# Patient Record
Sex: FEMALE | Race: WHITE | NOT HISPANIC OR LATINO | Employment: OTHER | URBAN - METROPOLITAN AREA
[De-identification: names, ages, dates, MRNs, and addresses within clinical notes are randomized per-mention and may not be internally consistent; named-entity substitution may affect disease eponyms.]

---

## 2021-05-30 ENCOUNTER — RECORDS - HEALTHEAST (OUTPATIENT)
Dept: ADMINISTRATIVE | Facility: CLINIC | Age: 61
End: 2021-05-30

## 2023-03-20 ENCOUNTER — APPOINTMENT (OUTPATIENT)
Dept: CT IMAGING | Facility: CLINIC | Age: 63
End: 2023-03-20
Attending: EMERGENCY MEDICINE
Payer: COMMERCIAL

## 2023-03-20 LAB
ANION GAP SERPL CALCULATED.3IONS-SCNC: 11 MMOL/L (ref 5–18)
ATRIAL RATE - MUSE: 93 BPM
BUN SERPL-MCNC: 19 MG/DL (ref 8–22)
CALCIUM SERPL-MCNC: 9.4 MG/DL (ref 8.5–10.5)
CHLORIDE BLD-SCNC: 108 MMOL/L (ref 98–107)
CO2 SERPL-SCNC: 21 MMOL/L (ref 22–31)
CREAT SERPL-MCNC: 0.77 MG/DL (ref 0.6–1.1)
DIASTOLIC BLOOD PRESSURE - MUSE: NORMAL MMHG
ERYTHROCYTE [DISTWIDTH] IN BLOOD BY AUTOMATED COUNT: 12.4 % (ref 10–15)
GFR SERPL CREATININE-BSD FRML MDRD: 88 ML/MIN/1.73M2
GLUCOSE BLD-MCNC: 98 MG/DL (ref 70–125)
HCT VFR BLD AUTO: 44.3 % (ref 35–47)
HGB BLD-MCNC: 15.2 G/DL (ref 11.7–15.7)
HOLD SPECIMEN: NORMAL
INTERPRETATION ECG - MUSE: NORMAL
MCH RBC QN AUTO: 31.7 PG (ref 26.5–33)
MCHC RBC AUTO-ENTMCNC: 34.3 G/DL (ref 31.5–36.5)
MCV RBC AUTO: 92 FL (ref 78–100)
P AXIS - MUSE: 61 DEGREES
PLATELET # BLD AUTO: 317 10E3/UL (ref 150–450)
POTASSIUM BLD-SCNC: 4.2 MMOL/L (ref 3.5–5)
PR INTERVAL - MUSE: 138 MS
QRS DURATION - MUSE: 76 MS
QT - MUSE: 364 MS
QTC - MUSE: 452 MS
R AXIS - MUSE: 51 DEGREES
RBC # BLD AUTO: 4.8 10E6/UL (ref 3.8–5.2)
SODIUM SERPL-SCNC: 140 MMOL/L (ref 136–145)
SYSTOLIC BLOOD PRESSURE - MUSE: NORMAL MMHG
T AXIS - MUSE: 62 DEGREES
TROPONIN I SERPL-MCNC: <0.01 NG/ML (ref 0–0.29)
VENTRICULAR RATE- MUSE: 93 BPM
WBC # BLD AUTO: 10.2 10E3/UL (ref 4–11)

## 2023-03-20 PROCEDURE — 84484 ASSAY OF TROPONIN QUANT: CPT | Performed by: EMERGENCY MEDICINE

## 2023-03-20 PROCEDURE — 36415 COLL VENOUS BLD VENIPUNCTURE: CPT | Performed by: EMERGENCY MEDICINE

## 2023-03-20 PROCEDURE — 85027 COMPLETE CBC AUTOMATED: CPT | Performed by: EMERGENCY MEDICINE

## 2023-03-20 PROCEDURE — 74174 CTA ABD&PLVS W/CONTRAST: CPT

## 2023-03-20 PROCEDURE — 99285 EMERGENCY DEPT VISIT HI MDM: CPT | Mod: 25

## 2023-03-20 PROCEDURE — 93005 ELECTROCARDIOGRAM TRACING: CPT | Performed by: EMERGENCY MEDICINE

## 2023-03-20 PROCEDURE — 80048 BASIC METABOLIC PNL TOTAL CA: CPT | Performed by: EMERGENCY MEDICINE

## 2023-03-20 PROCEDURE — 250N000011 HC RX IP 250 OP 636: Performed by: EMERGENCY MEDICINE

## 2023-03-20 RX ORDER — IOPAMIDOL 755 MG/ML
100 INJECTION, SOLUTION INTRAVASCULAR ONCE
Status: COMPLETED | OUTPATIENT
Start: 2023-03-20 | End: 2023-03-20

## 2023-03-20 RX ADMIN — IOPAMIDOL 100 ML: 755 INJECTION, SOLUTION INTRAVENOUS at 21:37

## 2023-03-20 ASSESSMENT — HEART SCORE
TROPONIN: LESS THAN OR EQUAL TO NORMAL LIMIT
AGE: 45-64
HISTORY: SLIGHTLY SUSPICIOUS
RISK FACTORS: 1-2 RISK FACTORS
ECG: NORMAL
HEART SCORE: 2

## 2023-03-20 NOTE — ED TRIAGE NOTES
Pt presents with left sided chest pain and left shoulder pain x 3 days. Shoulder pain has been on-going. Pt endorses intermittent shortness of breath. Pain reported as pressure. Pt is a smoker

## 2023-03-21 ENCOUNTER — PATIENT OUTREACH (OUTPATIENT)
Dept: ONCOLOGY | Facility: CLINIC | Age: 63
End: 2023-03-21
Payer: COMMERCIAL

## 2023-03-21 ENCOUNTER — HOSPITAL ENCOUNTER (EMERGENCY)
Facility: CLINIC | Age: 63
Discharge: HOME | End: 2023-03-21
Attending: EMERGENCY MEDICINE | Admitting: EMERGENCY MEDICINE
Payer: COMMERCIAL

## 2023-03-21 VITALS
RESPIRATION RATE: 16 BRPM | HEIGHT: 62 IN | DIASTOLIC BLOOD PRESSURE: 94 MMHG | OXYGEN SATURATION: 95 % | SYSTOLIC BLOOD PRESSURE: 171 MMHG | WEIGHT: 140 LBS | BODY MASS INDEX: 25.76 KG/M2 | HEART RATE: 81 BPM | TEMPERATURE: 97.9 F

## 2023-03-21 DIAGNOSIS — R07.9 CHEST PAIN, UNSPECIFIED TYPE: ICD-10-CM

## 2023-03-21 DIAGNOSIS — R19.00 PELVIC MASS: ICD-10-CM

## 2023-03-21 DIAGNOSIS — N94.89 ADNEXAL MASS: Primary | ICD-10-CM

## 2023-03-21 DIAGNOSIS — I10 HYPERTENSION, UNSPECIFIED TYPE: ICD-10-CM

## 2023-03-21 LAB — TROPONIN I SERPL-MCNC: <0.01 NG/ML (ref 0–0.29)

## 2023-03-21 PROCEDURE — 84484 ASSAY OF TROPONIN QUANT: CPT | Performed by: EMERGENCY MEDICINE

## 2023-03-21 PROCEDURE — 36415 COLL VENOUS BLD VENIPUNCTURE: CPT | Performed by: EMERGENCY MEDICINE

## 2023-03-21 ASSESSMENT — ACTIVITIES OF DAILY LIVING (ADL): ADLS_ACUITY_SCORE: 35

## 2023-03-21 NOTE — PROGRESS NOTES
New Patient Hematology / Oncology Nurse Navigator Note     Referral Date: 3/20/23    Referring provider:   Misa Griffin MD   [address]   [facility] [address]   Phone: [phone]   Fax: [phone]       Referring Clinic/Organization: [facility]     Referred to: GynOnc    Requested provider (if applicable): First available - did not specify     Evaluation for : Pelvic mass     Clinical History (per Nurse review of records provided):      CT CAP (done in ED) showing:  IMPRESSION:  1.  No thoracic or abdominal aortic dissection.    2.  Incidental left adnexal mass, likely ovarian, measuring 6.7 x 4.8 x 5.3 cm. This may be benign or malignant ovarian neoplasm. Follow-up pelvic ultrasound recommended, which could be obtained as an outpatient depending upon the clinical picture. -- BOOKMARKED     ED visit yesterday/today at -- BOOKMARKED    Clinical Assessment / Barriers to Care (Per Nurse):  [place] [facility] [place] [facility]     Records Needed:   N/A    Additional testing needed prior to consult:    would be helpful    Referral updates and Plan:   OUTGOING CALL to pt, no answer.     LVM introducing my role as nurse navigator with [facility] and that we have recd the referral for dx of pelvic mass from Dr Griffin    Explained to pt that he/she will receive a call from our scheduling intake team and provided our call-back number below if needed. Left direct # and requested call back to discuss referral (will explain additional labs will be helpful and would like to arrange prior to consult with GynOnc)         Katherine Vidales, BSN, RN, PHN, OCN  Hematology/Oncology Nurse Navigator  [facility]  [phone]

## 2023-03-21 NOTE — DISCHARGE INSTRUCTIONS
Our cardiology team will reach out to you and call you to help you set up an appointment with them in their office so they can talk to you about whether they recommend additional tests of your heart and so they can recheck your blood pressure in their office. Your blood pressure was elevated today in the ER and they need to recheck your blood pressure to see if it is still elevated at their office.    You have an incidentally seen mass in your pelvis. This may or may not be  a tumor. We saw this while looking for any cause of your chest pressure and it is not causing your chest pressure. Our gyn oncology team/tumor specialists will call you to set up an appointment with you in their office this week to talk about your next testing steps so they can determine what this mass is and how it should be treated and whether they recommend surgery to remove it.

## 2023-03-21 NOTE — Clinical Note
Isha Gregory was seen and treated in our emergency department on 3/20/2023.  She may return to work on 03/22/2023.       If you have any questions or concerns, please don't hesitate to call.      Misa Griffin MD

## 2023-03-21 NOTE — PROGRESS NOTES
Patient returned call. She would prefer [place] location and her mom has dementia and is being admitted to inpatient psych unit at the end of the week. Pt reports being overwhelmed with imaging finding in ED, provided emotional support. Explained labs would be helpful prior to visit with GynOnc which pt is agreeable to. Will arrange labs next week, then consult with Dr. Tolentino at [facility] 4/3. Will follow-up as needed pending  results. Pt has my direct number if further questions arise.     Katherine Vidales, GALON, RN, PHN, OCN  Hematology/Oncology Nurse Navigator  [facility]  [phone]

## 2023-03-21 NOTE — ED PROVIDER NOTES
EMERGENCY DEPARTMENT ENCOUNTER      NAME: Isha Gregory  AGE: 62 year old female  YOB: 1960  MRN: [id number]  EVALUATION DATE & TIME: No admission date for patient encounter.    PCP: Trung Michael    ED PROVIDER: Misa Griffin M.D.      No chief complaint on file.        FINAL IMPRESSION:  1. Chest pain, unspecified type    2. Hypertension, unspecified type    3. Pelvic mass          ED COURSE & MEDICAL DECISION MAKING:    ED Course as of 03/21/23 0050   Mon Mar 20, 2023   2110 Pt with atypical HEART 2 chest discomfort, she is ameanble to CTA r/o dissection with no PMD assessment for 20+ years, 2x troponin with reassuring EKG, electrolytes and CBC and clinically reassess   2347 CTA CAP without acute intrathroacic pathology reassuringly. Incidentally seen pelvic mass present, possibly ovarian, will give close obgyn follow up and oncology follow up in case neoplastic process, obgyn and oncology  referral placed and patient updated duncan rivas of finding   Tue Mar 21, 2023   0049 2nd troponin negative. Pt updated re: mass. She is open to follow up with gyn oncology and cardiology both. Patient discharged after being provided with extensive anticipatory guidance and given return precautions, importance of PMD follow-up emphasized.        Pertinent Labs & Imaging studies reviewed. (See chart for details)    N95 worn  A face shield was worn also  COVID PPE    Medical Decision Making    History:    Supplemental history from: Documented in chart, if applicable    External Record(s) reviewed: Documented in chart, if applicable.    Work Up:    Chart documentation includes differential considered and any EKGs or imaging independently interpreted by provider, where specified.    In additional to work up documented, I considered the following work up: Documented in chart, if applicable.    External consultation:    Discussion of management with another provider: Documented in chart, if  applicable    Complicating factors:    Care impacted by chronic illness: N/A    Care affected by social determinants of health: N/A and Access to Medical Care    Disposition considerations: Discharge. No recommendations on prescription strength medication(s). I considered admission, but discharged the patient after share decision making conversation.        At the conclusion of the encounter I discussed the results of all of the tests and the disposition. The questions were answered. The patient or family acknowledged understanding and was agreeable with the care plan.     MEDICATIONS GIVEN IN THE EMERGENCY:  Medications   iopamidol (ISOVUE-370) solution 100 mL (100 mLs Intravenous $Given 3/20/23 2132)       NEW PRESCRIPTIONS STARTED AT TODAY'S ER VISIT  New Prescriptions    No medications on file          =================================================================    HPI      Isha Gregory is a 62 year old female with PMHx of tobacco abuse and hasn't seen PMD for 20+ years who presents to the ED today via private vehicle with chest pain.    She reports intermittent squeezing feelings in her chest radiating to her left shoulder for a month, moreso over the last few days, mild, radiating down left arm. No inciting episode, no trauma/falls, no medications taken. This is her initial presentation. She is currently a smoker. No history of ACS, she is not on daily ASA. It comes and goes spontaneously and daily, not constant 1/10. No family h/o early cardiac death. No new cough or fever, she reports she does have a chronic smoker's cough.    REVIEW OF SYSTEMS   All other systems reviewed and are negative except as noted above in HPI.    PAST MEDICAL HISTORY:  No past medical history on file.    PAST SURGICAL HISTORY:  No past surgical history on file.    CURRENT MEDICATIONS:    amoxicillin-clavulanate (AUGMENTIN) 875-125 MG per tablet  DOXYCYCLINE HYCLATE PO  Phenylephrine-Ibuprofen (ADVIL CONGESTION RELIEF  "PO)  predniSONE (DELTASONE) 10 MG tablet        ALLERGIES:  No Known Allergies    FAMILY HISTORY:  No family history on file.    SOCIAL HISTORY:   Social History     Socioeconomic History     Marital status:    Tobacco Use     Smoking status: Every Day       VITALS:  Patient Vitals for the past 24 hrs:   BP Temp Temp src Pulse Resp SpO2 Height Weight   03/20/23 1820 (!) 177/86 97.9  F (36.6  C) Temporal 96 16 100 % 1.575 m (5' 2\") 63.5 kg (140 lb)       PHYSICAL EXAM    GENERAL: Awake, alert.  In no acute distress.   HEENT: Normocephalic, atraumatic.  Pupils equal, round and reactive.  Conjunctiva normal.  EOMI.  NECK: No stridor or apparent deformity.  PULMONARY: Symmetrical breath sounds without distress.  Lungs clear to auscultation bilaterally without wheezes, rhonchi or rales.  CARDIO: Regular rate and rhythm.  No significant murmur, rub or gallop.  Radial pulses strong and symmetrical.  ABDOMINAL: Abdomen soft, non-distended and non-tender to palpation.  No CVAT, no palpable hepatosplenomegaly.  EXTREMITIES: No lower extremity swelling or edema.    NEURO: Alert and oriented to person, place and time.  Cranial nerves grossly intact.  No focal motor deficit.  PSYCH: Normal mood and affect  SKIN: No rashes      LAB:  All pertinent labs reviewed and interpreted.  Results for orders placed or performed during the hospital encounter of 03/20/23   CTA Chest Abdomen Pelvis w Contrast    Impression    IMPRESSION:  1.  No thoracic or abdominal aortic dissection.  2.  Incidental left adnexal mass, likely ovarian, measuring 6.7 x 4.8 x 5.3 cm. This may be benign or malignant ovarian neoplasm. Follow-up pelvic ultrasound recommended, which could be obtained as an outpatient depending upon the clinical picture.     Extra Blue Top Tube   Result Value Ref Range    Hold Specimen JIC    Extra Green Top (Lithium Heparin) Tube   Result Value Ref Range    Hold Specimen JIC    Extra Purple Top Tube   Result Value Ref Range "    Hold Specimen     CBC (+ platelets, no diff)   Result Value Ref Range    WBC Count 10.2 4.0 - 11.0 10e3/uL    RBC Count 4.80 3.80 - 5.20 10e6/uL    Hemoglobin 15.2 11.7 - 15.7 g/dL    Hematocrit 44.3 35.0 - 47.0 %    MCV 92 78 - 100 fL    MCH 31.7 26.5 - 33.0 pg    MCHC 34.3 31.5 - 36.5 g/dL    RDW 12.4 10.0 - 15.0 %    Platelet Count 317 150 - 450 10e3/uL   Basic metabolic panel   Result Value Ref Range    Sodium 140 136 - 145 mmol/L    Potassium 4.2 3.5 - 5.0 mmol/L    Chloride 108 (H) 98 - 107 mmol/L    Carbon Dioxide (CO2) 21 (L) 22 - 31 mmol/L    Anion Gap 11 5 - 18 mmol/L    Urea Nitrogen 19 8 - 22 mg/dL    Creatinine 0.77 0.60 - 1.10 mg/dL    Calcium 9.4 8.5 - 10.5 mg/dL    Glucose 98 70 - 125 mg/dL    GFR Estimate 88 >60 mL/min/1.73m2   Result Value Ref Range    Troponin I <0.01 0.00 - 0.29 ng/mL   Result Value Ref Range    Troponin I <0.01 0.00 - 0.29 ng/mL   ECG 12-LEAD WITH MUSE (LHE)   Result Value Ref Range    Systolic Blood Pressure  mmHg    Diastolic Blood Pressure  mmHg    Ventricular Rate 93 BPM    Atrial Rate 93 BPM    IA Interval 138 ms    QRS Duration 76 ms     ms    QTc 452 ms    P Axis 61 degrees    R AXIS 51 degrees    T Axis 62 degrees    Interpretation ECG       Sinus rhythm  Possible Left atrial enlargement  Borderline ECG  When compared with ECG of 25-OCT-2007 17:14,  No significant change was found  Confirmed by SEE ED PROVIDER NOTE FOR, ECG INTERPRETATION ([id number]),  MARIANA CAT ([id number]) on 3/20/2023 7:48:06 PM         RADIOLOGY:  Reviewed all pertinent imaging. Please see official radiology report.  CTA Chest Abdomen Pelvis w Contrast   Final Result   IMPRESSION:   1.  No thoracic or abdominal aortic dissection.   2.  Incidental left adnexal mass, likely ovarian, measuring 6.7 x 4.8 x 5.3 cm. This may be benign or malignant ovarian neoplasm. Follow-up pelvic ultrasound recommended, which could be obtained as an outpatient depending upon the clinical picture.                EKG:    Reviewed and interpreted as: 1827 NSR without ST abnormalities, HR 93, same as 10/25/2007      I have independently reviewed and interpreted the EKG(s) documented above.         Misa Griffin MD  03/21/23 0050

## 2023-03-29 ENCOUNTER — LAB (OUTPATIENT)
Dept: LAB | Facility: CLINIC | Age: 63
End: 2023-03-29
Payer: COMMERCIAL

## 2023-03-29 DIAGNOSIS — N94.89 ADNEXAL MASS: ICD-10-CM

## 2023-03-29 LAB — CANCER AG125 SERPL-ACNC: 8 U/ML

## 2023-03-29 PROCEDURE — 36415 COLL VENOUS BLD VENIPUNCTURE: CPT

## 2023-03-29 PROCEDURE — 86304 IMMUNOASSAY TUMOR CA 125: CPT

## 2023-04-03 ENCOUNTER — ONCOLOGY VISIT (OUTPATIENT)
Dept: ONCOLOGY | Facility: HOSPITAL | Age: 63
End: 2023-04-03
Attending: EMERGENCY MEDICINE
Payer: COMMERCIAL

## 2023-04-03 VITALS
HEART RATE: 88 BPM | RESPIRATION RATE: 16 BRPM | OXYGEN SATURATION: 97 % | DIASTOLIC BLOOD PRESSURE: 82 MMHG | SYSTOLIC BLOOD PRESSURE: 154 MMHG | TEMPERATURE: 98.4 F

## 2023-04-03 DIAGNOSIS — Z00.00 PREVENTATIVE HEALTH CARE: Primary | ICD-10-CM

## 2023-04-03 DIAGNOSIS — R19.00 PELVIC MASS: ICD-10-CM

## 2023-04-03 PROCEDURE — 87624 HPV HI-RISK TYP POOLED RSLT: CPT | Performed by: OBSTETRICS & GYNECOLOGY

## 2023-04-03 PROCEDURE — G0145 SCR C/V CYTO,THINLAYER,RESCR: HCPCS | Performed by: OBSTETRICS & GYNECOLOGY

## 2023-04-03 PROCEDURE — 99213 OFFICE O/P EST LOW 20 MIN: CPT | Mod: 25 | Performed by: OBSTETRICS & GYNECOLOGY

## 2023-04-03 PROCEDURE — 99205 OFFICE O/P NEW HI 60 MIN: CPT | Mod: 57 | Performed by: OBSTETRICS & GYNECOLOGY

## 2023-04-03 RX ORDER — LORATADINE 10 MG/1
10 TABLET ORAL PRN
COMMUNITY

## 2023-04-03 RX ORDER — CEFAZOLIN SODIUM 2 G/100ML
2 INJECTION, SOLUTION INTRAVENOUS SEE ADMIN INSTRUCTIONS
Status: CANCELLED | OUTPATIENT
Start: 2023-04-03

## 2023-04-03 RX ORDER — CYANOCOBALAMIN (VITAMIN B-12) 500 MCG
400 LOZENGE ORAL
COMMUNITY

## 2023-04-03 RX ORDER — VITAMIN B COMPLEX
TABLET ORAL DAILY
COMMUNITY
End: 2023-05-02 | Stop reason: DRUGHIGH

## 2023-04-03 RX ORDER — MULTIVIT-MIN/FOLIC ACID/BIOTIN 200-300MCG
2 TABLET,CHEWABLE ORAL EVERY MORNING
COMMUNITY

## 2023-04-03 RX ORDER — CEFAZOLIN SODIUM 2 G/100ML
2 INJECTION, SOLUTION INTRAVENOUS
Status: CANCELLED | OUTPATIENT
Start: 2023-04-03

## 2023-04-03 RX ORDER — METRONIDAZOLE 500 MG/100ML
500 INJECTION, SOLUTION INTRAVENOUS
Status: CANCELLED | OUTPATIENT
Start: 2023-04-03

## 2023-04-03 RX ORDER — HEPARIN SODIUM 5000 [USP'U]/.5ML
5000 INJECTION, SOLUTION INTRAVENOUS; SUBCUTANEOUS
Status: CANCELLED | OUTPATIENT
Start: 2023-04-03

## 2023-04-03 RX ORDER — PHENAZOPYRIDINE HYDROCHLORIDE 100 MG/1
200 TABLET, FILM COATED ORAL ONCE
Status: CANCELLED | OUTPATIENT
Start: 2023-04-03 | End: 2023-04-03

## 2023-04-03 RX ORDER — MULTIVITAMIN WITH IRON
1 TABLET ORAL DAILY
COMMUNITY
End: 2023-05-02 | Stop reason: DRUGHIGH

## 2023-04-03 ASSESSMENT — PAIN SCALES - GENERAL: PAINLEVEL: MILD PAIN (3)

## 2023-04-03 NOTE — NURSING NOTE
"Oncology Rooming Note    April 3, 2023 2:23 PM   Isha Gregory is a 62 year old female who presents for:    Chief Complaint   Patient presents with     Oncology Clinic Visit     Gyn/Onc consult     Initial Vitals: BP (!) 154/82   Pulse 88   Temp 98.4  F (36.9  C)   Resp 16   LMP 09/01/2012 (Approximate)   SpO2 97%  Estimated body mass index is 25.61 kg/m  as calculated from the following:    Height as of 3/20/23: 1.575 m (5' 2\").    Weight as of 3/20/23: 63.5 kg (140 lb). There is no height or weight on file to calculate BSA.  Mild Pain (3) Comment: Data Unavailable   Patient's last menstrual period was 09/01/2012 (approximate).  Allergies reviewed: Yes  Medications reviewed: Yes    Medications: Medication refills not needed today.  Pharmacy name entered into [employer]: [id number] [place] [address] AT [facility] [address]    Clinical concerns: New consult for pelvic mass found incidentally when pt presented to ED with chest pain. She is very anxious today and in general, admittedly.  Dr. Tolentino was notified.      Anabella De Anda RN              "

## 2023-04-03 NOTE — PROGRESS NOTES
Consult Notes on Referred Patient    Date: 4/3/2023       Dr. Misa Griffin MD  [address] [place] [address]       RE: Isha Gregory  : 1960  UZIEL: 4/3/2023    Dear Dr. Misa Griffin:    I had the pleasure of seeing your patient Isha Gregory here at the Gynecologic Cancer Clinic at the [facility] on 4/3/2023.  As you know she is a very pleasant 62 year old woman with a recent diagnosis of complex pelvic mass.  Given these findings she was subsequently sent to the Gynecologic Cancer Clinic for new patient consultation.   G2, P0 went to menopause at age 51 has never been hormone placement therapy.  Started having postmenopausal bleeding did notice some constipation back last summer that has since then resolved normal urinary function she has been under a lot of stress as her mother has been dealing with dementia.  She had which was believed to be an anxiety attack that led to a evaluation in the emergency room.  She had normal troponins at that time of note she will follow-up with the cardiologist but again no abnormal cardiac findings.  As part of the work-up she underwent a CT chest abdomen pelvis which did not reveal an incidental complex cyst 6.7 cm pelvic mass.  No other signs of any local or distant disease.   was normal at 8.  She does not have any B symptoms.    Past Medical History:  Possible hypertension.    Past Surgical History:  Eyelid surgery.    Health Maintenance:  Health Maintenance Due   Topic Date Due     NICOTINE/TOBACCO CESSATION COUNSELING Q 1 YR  Never done     YEARLY PREVENTIVE VISIT  Never done     ADVANCE CARE PLANNING  Never done     MAMMO SCREENING  Never done     Pneumococcal Vaccine: Pediatrics (0 to 5 Years) and At-Risk Patients (6 to 64 Years) (1 - PCV) Never done     COLORECTAL CANCER SCREENING  Never done     HIV SCREENING  Never done     HEPATITIS C SCREENING  Never done     PAP  Never done     LIPID  Never done      ZOSTER IMMUNIZATION (1 of 2) Never done     COVID-19 Vaccine (3 - Booster for Pfizer series) 08/10/2021     INFLUENZA VACCINE (1) 09/01/2022       Patient never had an abnormal Pap smear.  She never had a colonoscopy.      Current Medications:     has a current medication list which includes the following prescription(s): ascorbic acid, HERBALS, loratadine, magnesium, womens multi gummies, pseudoephedrine-ibuprofen, turmeric, vitamin d3, and vitamin e.       Allergies:     Patient has no known allergies.        Social History:     Social History     Tobacco Use     Smoking status: Every Day     Packs/day: 1.00     Types: Cigarettes     Start date: 1/1/1977     Smokeless tobacco: Not on file   Vaping Use     Vaping status: Not on file   Substance Use Topics     Alcohol use: Not Currently       History   Drug Use Unknown           Family History:     Paternal grandmother had stomach cancer at age 72.  Paternal aunt had breast cancer at age 60.  Her mother had endometrial cancer at age 55    Family History   Problem Relation Age of Onset     Endometrial Cancer Mother      Stomach Cancer Paternal Grandmother      Breast Cancer Paternal Aunt          Physical Exam:     BP (!) 154/82   Pulse 88   Temp 98.4  F (36.9  C)   Resp 16   LMP 09/01/2012 (Approximate)   SpO2 97%   There is no height or weight on file to calculate BMI.    General Appearance: healthy and alert, no distress    Musculoskeletal: extremities non tender and without edema    Neurological: normal gait, no gross defects     Psychiatric: appropriate mood and affect                               Hematological: normal cervical, supraclavicular and inguinal lymph nodes     Gastrointestinal:       abdomen soft, non-tender, non-distended, no organomegaly or masses    Genitourinary: External genitalia and urethral meatus appears normal.  Vagina is smooth without nodularity or masses.  Cervix appears normal and without lesions.  Bimanual exam reveal no masses,  nodularity or fullness.  Recto-vaginal exam confirms these findings.  Pap smear was taken.      Assessment:    Isha Gregory is a 62 year old woman with a new diagnosis of complex pelvic mass.     A total of 60 minutes was spent with the patient, 40 minutes of which were spent in counseling the patient and/or treatment planning.      1. Complex left pelvic mass  2. Smoker  3. Possible undiagnosed HTN  4.  8      I reviewed with the patient and the CT scan which shows a 6.7 cm complex left adnexal mass.   was 8.  We did discuss I would recommend to proceed with a laparoscopic bilateral salpingo-oophorectomy.  If we do inquire a cancer we will proceed with an expiratory laparotomy abdominal hysterectomy and possible cancer staging and or debulking.  She will see a cardiologist on Thursday as she might have undiagnosed hypertension.  She will also see my colleagues anesthesia for preoperative optimization.  She agrees with this plan she is very appreciative of her care all questions were answered.    Risks, benefits and alternatives to proceed discussed in detail with the patient. Risks include but are not limited to bleeding, infection, possible injury to surrounding organs including bowel, bladder, ureter, need for second procedure/surgery related to complications from first procedure, postoperative medical complications such as cardiopulmonary events, lymphedema, lymphocyst, thromboembolic events.         Thank you for allowing us to participate in the care of your patient.         Sincerely,    Uziel Tolentino MD, MS    Department of Obstetrics and Gynecology   Division of Gynecologic Oncology   [facility]  Phone: [phone]    CC  Patient Care Team:  [facility] as PCP - General  Uziel Tolentino MD as MD (Gynecologic Oncology)  TERRANCE MUNROE

## 2023-04-03 NOTE — LETTER
4/3/2023         RE: Isha Gregory  [address]  [facility] [address]        Dear Colleague,    Thank you for referring your patient, Isha Gregory, to the [facility]. Please see a copy of my visit note below.                            Consult Notes on Referred Patient    Date: 4/3/2023       Dr. Misa Griffin MD  [address] [place] [address]       RE: Isha Gregory  : 1960  UZIEL: 4/3/2023    Dear Dr. Misa Griffin:    I had the pleasure of seeing your patient Isha Gregory here at the Gynecologic Cancer Clinic at the [facility] on 4/3/2023.  As you know she is a very pleasant 62 year old woman with a recent diagnosis of complex pelvic mass.  Given these findings she was subsequently sent to the Gynecologic Cancer Clinic for new patient consultation.   G2, P0 went to menopause at age 51 has never been hormone placement therapy.  Started having postmenopausal bleeding did notice some constipation back last summer that has since then resolved normal urinary function she has been under a lot of stress as her mother has been dealing with dementia.  She had which was believed to be an anxiety attack that led to a evaluation in the emergency room.  She had normal troponins at that time of note she will follow-up with the cardiologist but again no abnormal cardiac findings.  As part of the work-up she underwent a CT chest abdomen pelvis which did not reveal an incidental complex cyst 6.7 cm pelvic mass.  No other signs of any local or distant disease.   was normal at 8.  She does not have any B symptoms.    Past Medical History:  Possible hypertension.    Past Surgical History:  Eyelid surgery.    Health Maintenance:  Health Maintenance Due   Topic Date Due     NICOTINE/TOBACCO CESSATION COUNSELING Q 1 YR  Never done     YEARLY PREVENTIVE VISIT  Never done     ADVANCE CARE PLANNING  Never done     MAMMO SCREENING  Never done      Pneumococcal Vaccine: Pediatrics (0 to 5 Years) and At-Risk Patients (6 to 64 Years) (1 - PCV) Never done     COLORECTAL CANCER SCREENING  Never done     HIV SCREENING  Never done     HEPATITIS C SCREENING  Never done     PAP  Never done     LIPID  Never done     ZOSTER IMMUNIZATION (1 of 2) Never done     COVID-19 Vaccine (3 - Booster for Pfizer series) 08/10/2021     INFLUENZA VACCINE (1) 09/01/2022       Patient never had an abnormal Pap smear.  She never had a colonoscopy.      Current Medications:     has a current medication list which includes the following prescription(s): ascorbic acid, HERBALS, loratadine, magnesium, womens multi gummies, pseudoephedrine-ibuprofen, turmeric, vitamin d3, and vitamin e.       Allergies:     Patient has no known allergies.        Social History:     Social History     Tobacco Use     Smoking status: Every Day     Packs/day: 1.00     Types: Cigarettes     Start date: 1/1/1977     Smokeless tobacco: Not on file   Vaping Use     Vaping status: Not on file   Substance Use Topics     Alcohol use: Not Currently       History   Drug Use Unknown           Family History:     Paternal grandmother had stomach cancer at age 72.  Paternal aunt had breast cancer at age 60.  Her mother had endometrial cancer at age 55    Family History   Problem Relation Age of Onset     Endometrial Cancer Mother      Stomach Cancer Paternal Grandmother      Breast Cancer Paternal Aunt          Physical Exam:     BP (!) 154/82   Pulse 88   Temp 98.4  F (36.9  C)   Resp 16   LMP 09/01/2012 (Approximate)   SpO2 97%   There is no height or weight on file to calculate BMI.    General Appearance: healthy and alert, no distress    Musculoskeletal: extremities non tender and without edema    Neurological: normal gait, no gross defects     Psychiatric: appropriate mood and affect                               Hematological: normal cervical, supraclavicular and inguinal lymph nodes     Gastrointestinal:        abdomen soft, non-tender, non-distended, no organomegaly or masses    Genitourinary: External genitalia and urethral meatus appears normal.  Vagina is smooth without nodularity or masses.  Cervix appears normal and without lesions.  Bimanual exam reveal no masses, nodularity or fullness.  Recto-vaginal exam confirms these findings.  Pap smear was taken.      Assessment:    Isha Gregory is a 62 year old woman with a new diagnosis of complex pelvic mass.     A total of 60 minutes was spent with the patient, 40 minutes of which were spent in counseling the patient and/or treatment planning.      1. Complex left pelvic mass  2. Smoker  3. Possible undiagnosed HTN  4.  8      I reviewed with the patient and the CT scan which shows a 6.7 cm complex left adnexal mass.   was 8.  We did discuss I would recommend to proceed with a laparoscopic bilateral salpingo-oophorectomy.  If we do inquire a cancer we will proceed with an expiratory laparotomy abdominal hysterectomy and possible cancer staging and or debulking.  She will see a cardiologist on Thursday as she might have undiagnosed hypertension.  She will also see my colleagues anesthesia for preoperative optimization.  She agrees with this plan she is very appreciative of her care all questions were answered.    Risks, benefits and alternatives to proceed discussed in detail with the patient. Risks include but are not limited to bleeding, infection, possible injury to surrounding organs including bowel, bladder, ureter, need for second procedure/surgery related to complications from first procedure, postoperative medical complications such as cardiopulmonary events, lymphedema, lymphocyst, thromboembolic events.         Thank you for allowing us to participate in the care of your patient.         Sincerely,    Uziel Tolentino MD, MS    Department of Obstetrics and Gynecology   Division of Gynecologic Oncology   [facility]  [place]  Phone: [phone]      Patient Care Team:  Javed Nino as PCP - General  Uziel Tolentino MD as MD (Gynecologic Oncology)  TERRANCE MUNROE        Again, thank you for allowing me to participate in the care of your patient.        Sincerely,        Uziel Tolentino MD

## 2023-04-04 ENCOUNTER — HOSPITAL ENCOUNTER (INPATIENT)
Facility: CLINIC | Age: 63
Setting detail: SURGERY ADMIT
End: 2023-04-04
Attending: OBSTETRICS & GYNECOLOGY | Admitting: OBSTETRICS & GYNECOLOGY
Payer: COMMERCIAL

## 2023-04-04 ENCOUNTER — TELEPHONE (OUTPATIENT)
Dept: ONCOLOGY | Facility: CLINIC | Age: 63
End: 2023-04-04

## 2023-04-04 ENCOUNTER — PATIENT OUTREACH (OUTPATIENT)
Dept: ONCOLOGY | Facility: CLINIC | Age: 63
End: 2023-04-04

## 2023-04-04 NOTE — TELEPHONE ENCOUNTER
RN Care Coordinator: Annalee Valles; [phone]    Surgery is scheduled with Dr. Tolentino   Date: 5/16   Location: [facility]  Scheduled per: next available date      H&P to be completed by Primary Care team; patient to schedule     Post-op: 5/31, in person visit    Patient will receive a phone call from pre-admission nurses 1-2 days prior to surgery with arrival and start time.     Spoke with the patient and was able to confirm all scheduled information.      Patient questions/concerns: patient would like a call from RNCC. Pt stated that she remembers being asked about some scar/incision during her pelvic exam that she wanted to talk about with nurse.        Surgery packet to be sent via US mail    __    Sofía Torres, on 4/4/2023 on 2:26 PM  P: [phone]

## 2023-04-04 NOTE — PROGRESS NOTES
Patient reached out and left voicemail stating she has updated health history     Patient stated that she has had some surgeries that she had forgotten about when speaking with MD yesterday    RN reached back out to patient but patient unavailable.     Voicemail and call back number left     Elayne Valles RN

## 2023-04-05 NOTE — TELEPHONE ENCOUNTER
Hi    Sorry that was my misunderstanding. I will remember that he always wants PAC.     I have reached out and LVM for patient to contact me.    Sofía Torres on 4/5/2023 at 1:16 PM

## 2023-04-06 LAB
BKR LAB AP GYN ADEQUACY: NORMAL
BKR LAB AP GYN INTERPRETATION: NORMAL
BKR LAB AP HPV REFLEX: NORMAL
BKR LAB AP LMP: NORMAL
BKR LAB AP PREVIOUS ABNORMAL: NORMAL
PATH REPORT.COMMENTS IMP SPEC: NORMAL
PATH REPORT.COMMENTS IMP SPEC: NORMAL
PATH REPORT.RELEVANT HX SPEC: NORMAL

## 2023-04-07 ENCOUNTER — TELEPHONE (OUTPATIENT)
Dept: ONCOLOGY | Facility: CLINIC | Age: 63
End: 2023-04-07
Payer: COMMERCIAL

## 2023-04-07 NOTE — TELEPHONE ENCOUNTER
Spoke with patient regarding scheduling PAC appointment.    Patient also asked about switching her surgery  Date to a later time, as Pt now has to have some oral surgery done. Writer adv Pt that next opening would be 5/30. Pt was ok with that  But having issues when having to schedule post-op at an early time (what was avail at [facility]) Writer suggested PT keep current Or date/time and wait to see what dentist says at [facility] today.     Pt stated that she would call writer directly after learning what needs to be done orally.     __    Sofía Torres, on 4/7/2023 at 12:16 PM  P: [phone]

## 2023-04-10 LAB
HUMAN PAPILLOMA VIRUS 16 DNA: NEGATIVE
HUMAN PAPILLOMA VIRUS 18 DNA: NEGATIVE
HUMAN PAPILLOMA VIRUS FINAL DIAGNOSIS: NORMAL
HUMAN PAPILLOMA VIRUS OTHER HR: NEGATIVE

## 2023-04-10 NOTE — TELEPHONE ENCOUNTER
FUTURE VISIT INFORMATION      SURGERY INFORMATION:    Date: 5/16/23    Location: [facility]    Surgeon:  Uziel Tolentino MD    Anesthesia Type:  general    Procedure: SALPINGO-OOPHORECTOMY, LAPAROSCOPIC, possible open, possible cancer staging, possible hysterectomy, possible debulking    Consult: ov 4/3    RECORDS REQUESTED FROM:         Most recent EKG+ Tracing: 3/21/23

## 2023-04-13 ENCOUNTER — TELEPHONE (OUTPATIENT)
Dept: ONCOLOGY | Facility: CLINIC | Age: 63
End: 2023-04-13
Payer: COMMERCIAL

## 2023-04-13 NOTE — TELEPHONE ENCOUNTER
Spoke with patient regarding scheduled surgery with Dr. Mccray.    Pt had questions regarding the dates and times. Pt seemed like she was wanting to change the date. Writer advised Pt of next avail appts. Writer then answered the questions Pt has the writer is qualified to answer. Pt was happy and decided to stay with original surgery date.     No follow up needed at this time.     __    Sofía Torres, on 4/13/2023 at 2:54 PM  P: [phone]

## 2023-04-13 NOTE — TELEPHONE ENCOUNTER
Received voicemail from patient on 4/13 regarding scheduled surgery with Dr. Morley.    Writer will call patient back at a later time to discuss.  __    Sofía Torres, on 4/13/2023  P: [phone]

## 2023-05-02 ENCOUNTER — PATIENT OUTREACH (OUTPATIENT)
Dept: ONCOLOGY | Facility: CLINIC | Age: 63
End: 2023-05-02

## 2023-05-02 ENCOUNTER — OFFICE VISIT (OUTPATIENT)
Dept: SURGERY | Facility: CLINIC | Age: 63
End: 2023-05-02
Payer: COMMERCIAL

## 2023-05-02 ENCOUNTER — ANESTHESIA EVENT (OUTPATIENT)
Dept: SURGERY | Facility: CLINIC | Age: 63
End: 2023-05-02

## 2023-05-02 ENCOUNTER — PRE VISIT (OUTPATIENT)
Dept: SURGERY | Facility: CLINIC | Age: 63
End: 2023-05-02

## 2023-05-02 VITALS
SYSTOLIC BLOOD PRESSURE: 155 MMHG | WEIGHT: 161.6 LBS | HEIGHT: 62 IN | DIASTOLIC BLOOD PRESSURE: 90 MMHG | RESPIRATION RATE: 16 BRPM | OXYGEN SATURATION: 98 % | BODY MASS INDEX: 29.74 KG/M2 | TEMPERATURE: 97.9 F | HEART RATE: 87 BPM

## 2023-05-02 DIAGNOSIS — Z01.818 PRE-OP EVALUATION: Primary | ICD-10-CM

## 2023-05-02 PROCEDURE — 99205 OFFICE O/P NEW HI 60 MIN: CPT | Performed by: PHYSICIAN ASSISTANT

## 2023-05-02 RX ORDER — BUTYROSPERMUM PARKII(SHEA BUTTER), SIMMONDSIA CHINENSIS (JOJOBA) SEED OIL, ALOE BARBADENSIS LEAF EXTRACT .01; 1; 3.5 G/100G; G/100G; G/100G
2 LIQUID TOPICAL AT BEDTIME
COMMUNITY

## 2023-05-02 RX ORDER — MENTHOL 0.16 G/ML
LIQUID TOPICAL PRN
COMMUNITY

## 2023-05-02 RX ORDER — MUPIROCIN 20 MG/G
OINTMENT TOPICAL PRN
COMMUNITY

## 2023-05-02 ASSESSMENT — PAIN SCALES - GENERAL: PAINLEVEL: NO PAIN (0)

## 2023-05-02 ASSESSMENT — LIFESTYLE VARIABLES: TOBACCO_USE: 1

## 2023-05-02 ASSESSMENT — ENCOUNTER SYMPTOMS: SEIZURES: 0

## 2023-05-02 NOTE — PROGRESS NOTES
RNCC was updated that patient had questions and concerns about her upcoming surgery    RNCC attempted to reach out but patient was unavailable. Voicemail with call back number left     Elayne Valles RN

## 2023-05-02 NOTE — PATIENT INSTRUCTIONS
Preparing for Your Surgery      Name:  Isha Gregory   MRN:  [id number]   :  1960   Today's Date:  2023       Arriving for surgery:  Surgery date:  23  Arrival time:  6 am    Please come to:     [facility] [address]  [address] [place]  [address]    -   parking is available in front of the hospital for those with mobility difficulties.     -  Parking is also available at the Patient Visitor Ramp on [facility].    -  When entering the hospital, you will be asked some Covid screening questions and directed to Patient Registration. Patient Registration will then direct you to the 3rd floor Surgery Waiting Room.  [phone]?     - ?If you are in need of directions, wheelchair or escort please stop at the Information Desk in the lobby.  Inform the information person that you are here for surgery; a wheelchair and escort to [address] will be provided.?     What can I eat or drink?  -  You may eat and drink normally up to 8 hours prior to arrival time. (Until 10 pm 5-15-23)  -  You may have clear liquids until 2 hours prior to arrival time. (Until 4 am)    Examples of clear liquids:  Water  Clear broth  Juices (apple, white grape, white cranberry  and cider) without pulp  Noncarbonated, powder based beverages  (lemonade and Tarun-Aid)  Sodas (Sprite, 7-Up, ginger ale and seltzer)  Coffee or tea (without milk or cream)  Gatorade    -  No Alcohol for at least 24 hours before surgery.     Which medicines can I take?  Hold Aspirin for 7 days before surgery.   Hold Multivitamins for 7 days before surgery.  Hold Supplements for 7 days before surgery. (Turmeric, Vitamin E, and Herbal Nopela)  Hold Ibuprofen (Advil, Motrin)/Ibuprofen products for 1 day before surgery--unless otherwise directed by surgeon. (Pseudoephedrine-Ibuprofen or Advil Cold & Sinus)  Hold Naproxen (Aleve) for 4 days before surgery.  Acetaminophen  (Tylenol) is okay to take if needed.    -  DO NOT take these medications the day of surgery:  Magnesium Oxide  Menthol topical (Icy Hot)    -  PLEASE TAKE these medications the day of surgery:  Loratadine (Claritin) if needed  Acetaminophen (Tylenol) if needed    How do I prepare myself?  - Please take 2 showers (one the night prior to surgery and one the morning of surgery) using Scrubcare or Hibiclens soap.    Use this soap only from the neck to your toes.     Leave the soap on your skin for one minute--then rinse thoroughly.      You may use your own shampoo and conditioner. No other hair products.   - Please remove all jewelry and body piercings.  - No lotions, deodorants or fragrance.  - No makeup or fingernail polish.   - Bring your ID and insurance card.    -If you have a Deep Brain Stimulator, Spinal Cord Stimulator, or any Neuro Stimulator device---you must bring the remote control to the hospital.      ALL PATIENTS GOING HOME THE SAME DAY OF SURGERY ARE REQUIRED TO HAVE A RESPONSIBLE ADULT TO DRIVE AND BE IN ATTENDANCE WITH THEM FOR 24 HOURS FOLLOWING SURGERY.    Covid testing policy as of 12/06/2022  Your surgeon will notify and schedule you for a COVID test if one is needed before surgery--please direct any questions or COVID symptoms to your surgeon      Questions or Concerns:    - For any questions regarding the day of surgery or your hospital stay, please contact the Pre Admission Nursing Office at [phone].       - If you have health changes between today and your surgery, please call your surgeon.       - For questions after surgery, please call your surgeons office.           Current Visitor Guidelines       Surgeries and procedures: Adult patients can have 2 visitors all through the surgery process.     Visiting hours: 8 a.m. to 8:30 p.m.     Hospital: Adult patients and children under age 18 can have 4 visitor at a time       No visitors under the age of 5 are allowed for hospital  patients.       Double occupancy rooms: Patients can have only two visitors at a time.       Patients confirmed or suspected to have symptoms of COVID 19 or flu:     No visitors allowed for adult patients.   Children (under age 18) can have 1 named visitor.     People who are sick or showing symptoms of COVID 19 or flu:    Are not allowed to visit patients--we can only make exceptions in special situations.       Please follow these guidelines for your visit:          Please maintain social distance          Masking is optional--however at times you may be asked to wear a mask for the safety of yourself and others     Clean your hands with alcohol hand . Do this when you arrive at and leave the building and patient room,    And again after you touch your mask or anything in the room.     Go directly to and from the room you are visiting.     Stay in the patient s room during your visit. Limit going to other places in the hospital as much as possible     Leave bags and jackets at home or in the car.     For everyone s health, please don t come and go during your visit. That includes for smoking   during your visit.

## 2023-05-02 NOTE — H&P
Pre-Operative H & P     CC:  Preoperative exam to assess for increased cardiopulmonary risk while undergoing surgery and anesthesia.    Date of Encounter: 5/2/2023  Primary Care Physician:  [facility]     Reason for visit:   Encounter Diagnosis   Name Primary?     Pre-op evaluation Yes       HPI  Isha Gregory is a 62 year old female who presents for pre-operative H & P in preparation for  Procedure Information     Case: [id number] Date/Time: 05/16/23 0800    Procedures:       SALPINGO-OOPHORECTOMY, LAPAROSCOPIC, (Bilateral: Abdomen)      possible open, possible cancer staging, possible hysterectomy, possible debulking (Bilateral: Abdomen)    Anesthesia type: General    Diagnosis: Pelvic mass [R19.00]    Pre-op diagnosis: Pelvic mass [R19.00]    Location: [facility]  /  OR    Providers: Uziel Tolentino MD          Patient is being evaluated for comorbid conditions of current tobacco use    Ms. Gregory was recently diagnosed with a complex pelvic mass. She was referred to Dr. Tolentino for further evaluation. She is now scheduled for the above procedure.     History is obtained from the patient and chart review    Hx of abnormal bleeding or anti-platelet use: denies    Menstrual history: Patient's last menstrual period was 09/01/2012 (approximate).     Past Medical History  No past medical history on file.    Past Surgical History  Past Surgical History:   Procedure Laterality Date     LAPAROSCOPY         Prior to Admission Medications  Current Outpatient Medications   Medication Sig Dispense Refill     Ascorbic Acid (VITAMIN C ADULT GUMMIES PO) Take 1 Gum by mouth At Bedtime       cholecalciferol (D3 2000) 50 MCG (2000 UT) CAPS Take 2 capsules by mouth At Bedtime       HERBALS Take by mouth every morning Nopela, supplement, 5 oz per day       loratadine (CLARITIN) 10 MG tablet Take 10 mg by mouth as needed       magnesium oxide 200 MG TABS Take 200 mg by mouth every morning       Menthol, Topical  Analgesic, (ICY HOT) 16 % LIQD Externally apply topically as needed       Multiple Vitamins-Minerals (WOMENS MULTI GUMMIES) CHEW Take 2 Gum by mouth every morning       mupirocin (BACTROBAN) 2 % external ointment Apply topically as needed       Pseudoephedrine-Ibuprofen (ADVIL COLD/SINUS PO) Take 200 mg by mouth as needed       TURMERIC CURCUMIN PO Take 2 tablets by mouth daily (with breakfast)       vitamin E 400 units TABS Take 400 Units by mouth three times a week         Allergies  No Known Allergies    Social History  Social History     Socioeconomic History     Marital status:      Spouse name: Not on file     Number of children: Not on file     Years of education: Not on file     Highest education level: Not on file   Occupational History     Not on file   Tobacco Use     Smoking status: Every Day     Packs/day: 1.00     Types: Cigarettes     Start date: 1/1/1977     Smokeless tobacco: Not on file   Vaping Use     Vaping status: Not on file   Substance and Sexual Activity     Alcohol use: Not Currently     Drug use: Not Currently     Sexual activity: Yes     Partners: Male     Birth control/protection: Post-menopausal   Other Topics Concern     Not on file   Social History Narrative     Not on file     Social Determinants of Health     Financial Resource Strain: Not on file   Food Insecurity: Not on file   Transportation Needs: Not on file   Physical Activity: Not on file   Stress: Not on file   Social Connections: Not on file   Intimate Partner Violence: Not on file   Housing Stability: Not on file       Family History  Family History   Problem Relation Age of Onset     Endometrial Cancer Mother      Stomach Cancer Paternal Grandmother      Breast Cancer Paternal Aunt      Anesthesia Reaction No family hx of      Deep Vein Thrombosis (DVT) No family hx of        Review of Systems  The complete review of systems is negative other than noted in the HPI or here.   Anesthesia Evaluation   Pt has had prior  "anesthetic.     History of anesthetic complications  - PONV.      ROS/MED HX  ENT/Pulmonary:     (+) tobacco use, Current use, Intermittent, asthma     Neurologic:  - neg neurologic ROS  (-) no seizures and no CVA   Cardiovascular:     (+) -----Previous cardiac testing   Echo: Date: Results:    Stress Test: Date: Results:    ECG Reviewed: Date: 3/2023 Results:  SR, possible LAE  Cath: Date: Results:   (-) taking anticoagulants/antiplatelets   METS/Exercise Tolerance: 4 - Raking leaves, gardening Comment: Wan walk trails and will carry a big bucket of seeds for the birds. Reports some fatigue, denies MOBLEY or CP   Hematologic:  - neg hematologic  ROS  (-) history of blood clots and history of blood transfusion   Musculoskeletal:  - neg musculoskeletal ROS     GI/Hepatic:  - neg GI/hepatic ROS   (+) GERD (intermittent, usign ericka and honey. no medications), Asymptomatic on medication,     Renal/Genitourinary: Comment: Complex pelvic mass        Endo:  - neg endo ROS  (-) chronic steroid usage   Psychiatric/Substance Use:     (+) psychiatric history anxiety     Infectious Disease:  - neg infectious disease ROS     Malignancy:       Other:            BP (!) 155/90 (BP Location: Right arm, Patient Position: Sitting, Cuff Size: Adult Regular)   Pulse 87   Temp 97.9  F (36.6  C) (Oral)   Resp 16   Ht 1.575 m (5' 2\")   Wt 73.3 kg (161 lb 9.6 oz)   LMP 09/01/2012 (Approximate)   SpO2 98%   Breastfeeding No   BMI 29.56 kg/m      Physical Exam   Constitutional: Awake, alert, cooperative, no apparent distress, and appears stated age.  Eyes: Pupils equal, round and reactive to light, extra ocular muscles intact, sclera clear, conjunctiva normal.  HENT: Normocephalic, oral pharynx with moist mucus membranes, one missing upper right molar  Respiratory: Clear to auscultation bilaterally, no crackles or wheezing.  Cardiovascular: Regular rate and rhythm, normal S1 and S2, and no murmur noted.  Carotids +2, no bruits. No " edema. Palpable pulses to radial arteries.   GI: Normal bowel sounds, soft, non-distended, non-tender  Genitourinary:  deferred  Skin: Warm and dry.  No rashes at anticipated surgical site.   Musculoskeletal: Full ROM of neck. There is no redness, warmth, or swelling of the exposed joints. Gross motor strength is normal.    Neurologic: Awake, alert, oriented to name, place and time. Cranial nerves II-XII are grossly intact. Gait is normal.   Neuropsychiatric: Calm, cooperative. Normal affect.     Prior Labs/Diagnostic Studies   All labs and imaging personally reviewed     EKG/ stress test - if available please see in ROS above   No results found.       View : No data to display.              Component      Latest Ref Rng 3/20/2023  9:08 PM   Sodium      136 - 145 mmol/L 140    Potassium      3.5 - 5.0 mmol/L 4.2    Chloride      98 - 107 mmol/L 108 (H)    Carbon Dioxide      22 - 31 mmol/L 21 (L)    Anion Gap      5 - 18 mmol/L 11    Urea Nitrogen      8 - 22 mg/dL 19    Creatinine      0.60 - 1.10 mg/dL 0.77    Calcium      8.5 - 10.5 mg/dL 9.4    Glucose      70 - 125 mg/dL 98    GFR Estimate      >60 mL/min/1.73m2 88    WBC      4.0 - 11.0 10e3/uL 10.2    RBC Count      3.80 - 5.20 10e6/uL 4.80    Hemoglobin      11.7 - 15.7 g/dL 15.2    Hematocrit      35.0 - 47.0 % 44.3    MCV      78 - 100 fL 92    MCH      26.5 - 33.0 pg 31.7    MCHC      31.5 - 36.5 g/dL 34.3    RDW      10.0 - 15.0 % 12.4    Platelet Count      150 - 450 10e3/uL 317       Legend:  (H) High  (L) Low      The patient's records and results personally reviewed by this provider.     Outside records reviewed from: Care Everywhere    LAB/DIAGNOSTIC STUDIES TODAY:  none    Assessment      Isha Gregory is a 62 year old female seen as a PAC referral for risk assessment and optimization for anesthesia.    Plan/Recommendations  Pt will be optimized for the proposed procedure.  See below for details on the assessment, risk, and preoperative  "recommendations    NEUROLOGY  - No history of TIA, CVA or seizure  -Post Op delirium risk factors:  No risk identified    ENT  - No current airway concerns.  Will need to be reassessed day of surgery.  Mallampati: II  TM: > 3    CARDIAC  - No history of CAD, Hypertension and Afib   -denies cardiac symptoms   -h/o ED visit with negative troponins. Symptoms were thought to be secondary to anxiety attack. CTA 3/2023 without coronary artery calcifications. Patient reports she walks trails around her house with a large bucket of heavy birdseed without exertional symptoms. She states she does feel more fatigued in general, but no CP or MOBLEY. She has an appointment with cardiology this Thursday- I will watch for any preop optimization recommendations.   - METS (Metabolic Equivalents)  Patient performs 4 or more METS exercise without symptoms            Total Score: 0      RCRI-Low risk: Class 2 0.9% complication rate            Total Score: 1    RCRI: High Risk Surgery        PULMONARY  PATRICK Low Risk            Total Score: 1    PATRICK: Over 50 ys old      - Denies asthma or inhaler use  - Tobacco History      History   Smoking Status     Every Day     Packs/day: 1.00     Types: Cigarettes     Start date: 1/1/1977   Smokeless Tobacco     Not on file       GI  PONV High Risk  Total Score: 3           1 AN PONV: Pt is Female    1 AN PONV: Patient has history of PONV    1 AN PONV: Intended Post Op Opioids        /RENAL  - Baseline Creatinine WNL  -complex pelvic mass with above procedure planned. She has multiple surgery specific questions she would like to be addressed prior to proceeding with surgery. I have messaged Annalee [facility] and asked that she reach out to the patient to discuss    ENDOCRINE    - BMI: Estimated body mass index is 29.56 kg/m  as calculated from the following:    Height as of this encounter: 1.575 m (5' 2\").    Weight as of this encounter: 73.3 kg (161 lb 9.6 oz).  Overweight (BMI 25.0-29.9)  - No history of " Diabetes Mellitus    HEME  VTE Low Risk 0.26%            Total Score: 1    VTE: Greater than 59 yrs old      - No history of abnormal bleeding or antiplatelet use.     PSYCH  -patient has multiple life stressors currently. She is also anxious about her upcoming surgery. I have placed a referral to behavioral health per patient request.     Different anesthesia methods/types have been discussed with the patient, but they are aware that the final plan will be decided by the assigned anesthesia provider on the date of service.    The patient is optimized for their procedure. AVS with information on surgery time/arrival time, meds and NPO status given by nursing staff. No further diagnostic testing indicated.      On the day of service:     Prep time: 13 minutes  Visit time: 47 minutes  Documentation time: 12 minutes  ------------------------------------------  Total time: 72 minutes      Jennifer Amezquita PA-C  Preoperative Assessment Center  [facility]  Phone: [phone]  Fax: [phone]

## 2023-05-04 ENCOUNTER — OFFICE VISIT (OUTPATIENT)
Dept: CARDIOLOGY | Facility: CLINIC | Age: 63
End: 2023-05-04
Payer: COMMERCIAL

## 2023-05-04 VITALS
HEIGHT: 62 IN | BODY MASS INDEX: 29.44 KG/M2 | SYSTOLIC BLOOD PRESSURE: 138 MMHG | DIASTOLIC BLOOD PRESSURE: 88 MMHG | HEART RATE: 81 BPM | WEIGHT: 160 LBS | RESPIRATION RATE: 16 BRPM

## 2023-05-04 DIAGNOSIS — R07.9 CHEST PAIN, UNSPECIFIED TYPE: ICD-10-CM

## 2023-05-04 DIAGNOSIS — I10 HYPERTENSION, UNSPECIFIED TYPE: ICD-10-CM

## 2023-05-04 PROCEDURE — 99204 OFFICE O/P NEW MOD 45 MIN: CPT | Performed by: INTERNAL MEDICINE

## 2023-05-04 NOTE — PROGRESS NOTES
[facility]ARE ENCOUNTER CONSULTATON [facility]  [phone]      Assessment/Recommendations   Assessment:  1.  Chest pain: Atypical chest discomfort and discussed this with her today.  She is highly concerned about her family history and knows that she is high risk due to long history of smoking as well.  We discussed further evaluation and she would like to proceed with stress testing.  We will proceed with exercise stress echocardiogram.  If not high risk may proceed with surgery as planned.  2.  Smoking history  3.  Hidradenitis suppurativa  4.  Pelvic mass    Plan:  1.  Exercise stress echo  2.  Smoking cessation counseling  Follow-up depending on above results       History of Present Illness/Subjective    HPI: Isha Gregory is a 62 year old female with history of hidradenitis suppurativa and pelvic mass who I am seeing today in rapid access clinic after recent ED visit with chest pain.  In mid March she started experiencing discomfort in the mid chest area rating to the left shoulder and down her left arm at times that felt like an intermittent squeezing sensation.  She was dealing with a lot of stress at that time particularly with her mother who has dementia.  She feels that the chest pain is related to stress.  Work-up in the ED was unremarkable including troponin, twelve-lead EKG which showed normal sinus rhythm at 93 bpm and no significant ST-T abnormalities.  Since then she has not had any further chest pain.  She denies any exertional chest pain.  She has some shortness of breath with exertion that has remained unchanged.  She smokes a pack a day for many years.  She was found to have a pelvic mass which is suspected to be not malignant however laparoscopic salpingo-oophorectomy has been planned to further evaluate.  Patient feels that her hidradenitis has been acting up and that she will not be able to have surgery due to infection risk.  She has a significant family  "history of heart disease with her brother having history of coronary artery disease in his 50s and , father history of heart disease and  in his 60s.         Physical Examination  Review of Systems   Vitals: /88 (BP Location: Right arm, Patient Position: Sitting, Cuff Size: Adult Regular)   Pulse 81   Resp 16   Ht 1.575 m (5' 2\")   Wt 72.6 kg (160 lb)   LMP 2012 (Approximate)   BMI 29.26 kg/m    BMI= Body mass index is 29.26 kg/m .  Wt Readings from Last 3 Encounters:   23 72.6 kg (160 lb)   23 73.3 kg (161 lb 9.6 oz)   23 63.5 kg (140 lb)       General Appearance:   no distress, normal body habitus   ENT/Mouth: membranes moist, no oral lesions or bleeding gums.      EYES:  no scleral icterus, normal conjunctivae   Neck: no carotid bruits or thyromegaly   Chest/Lungs:   lungs are clear to auscultation   Cardiovascular:   Regular. Normal first and second heart sounds with no murmurs  no edema bilaterally    Abdomen:  no organomegaly, masses, bruits, or tenderness; bowel sounds are present   Extremities: no cyanosis or clubbing   Skin: no xanthelasma, warm.    Neurologic:  no tremors     Psychiatric: alert and oriented x3, calm        Please refer above for cardiac ROS details.        Medical History  Surgical History Family History Social History   No past medical history on file.  Past Surgical History:   Procedure Laterality Date     LAPAROSCOPY       Family History   Problem Relation Age of Onset     Endometrial Cancer Mother      Stomach Cancer Paternal Grandmother      Breast Cancer Paternal Aunt      Anesthesia Reaction No family hx of      Deep Vein Thrombosis (DVT) No family hx of         Social History     Socioeconomic History     Marital status:      Spouse name: Not on file     Number of children: Not on file     Years of education: Not on file     Highest education level: Not on file   Occupational History     Not on file   Tobacco Use     Smoking " status: Every Day     Packs/day: 1.00     Types: Cigarettes     Start date: 1/1/1977     Smokeless tobacco: Not on file   Vaping Use     Vaping status: Not on file   Substance and Sexual Activity     Alcohol use: Not Currently     Drug use: Not Currently     Sexual activity: Yes     Partners: Male     Birth control/protection: Post-menopausal   Other Topics Concern     Not on file   Social History Narrative     Not on file     Social Determinants of Health     Financial Resource Strain: Not on file   Food Insecurity: Not on file   Transportation Needs: Not on file   Physical Activity: Not on file   Stress: Not on file   Social Connections: Not on file   Intimate Partner Violence: Not on file   Housing Stability: Not on file           Medications  Allergies   Current Outpatient Medications   Medication Sig Dispense Refill     Ascorbic Acid (VITAMIN C ADULT GUMMIES PO) Take 1 Gum by mouth At Bedtime       cholecalciferol (D3 2000) 50 MCG (2000 UT) CAPS Take 2 capsules by mouth At Bedtime       HERBALS Take by mouth every morning Nopela, supplement, 5 oz per day       loratadine (CLARITIN) 10 MG tablet Take 10 mg by mouth as needed       MAGNESIUM CITRATE PO Take 1 Gum by mouth daily       Menthol, Topical Analgesic, (ICY HOT) 16 % LIQD Externally apply topically as needed       Multiple Vitamins-Minerals (WOMENS MULTI GUMMIES) CHEW Take 2 Gum by mouth every morning       mupirocin (BACTROBAN) 2 % external ointment Apply topically as needed       Pseudoephedrine-Ibuprofen (ADVIL COLD/SINUS PO) Take 200 mg by mouth as needed       TURMERIC CURCUMIN PO Take 2 tablets by mouth daily (with breakfast)       vitamin E 400 units TABS Take 400 Units by mouth three times a week       No Known Allergies       Lab Results    Chemistry/lipid CBC Cardiac Enzymes/BNP/TSH/INR   No results for input(s): CHOL, HDL, LDL, TRIG, CHOLHDLRATIO in the last 72194 hours.  No results for input(s): LDL in the last 23726 hours.  Recent Labs   Lab  Test 03/20/23 2108      POTASSIUM 4.2   CHLORIDE 108*   CO2 21*   GLC 98   BUN 19   CR 0.77   GFRESTIMATED 88   SUSAN 9.4     Recent Labs   Lab Test 03/20/23 2108   CR 0.77     No results for input(s): A1C in the last 86440 hours.       Recent Labs   Lab Test 03/20/23 2108   WBC 10.2   HGB 15.2   HCT 44.3   MCV 92        Recent Labs   Lab Test 03/20/23 2108   HGB 15.2    Recent Labs   Lab Test 03/21/23  0002 03/20/23 2108   TROPONINI <0.01 <0.01     No results for input(s): BNP, NTBNPI, NTBNP in the last 27086 hours.  No results for input(s): TSH in the last 03461 hours.  No results for input(s): INR in the last 01353 hours.     Trupti Reina MD

## 2023-05-04 NOTE — LETTER
5/4/2023    [facility]  [address] [facility] [place] [address]    RE: Isha ORDAZ Colt       Dear Colleague,     I had the pleasure of seeing Isha Gregory in the [facility].    HEART CARE ENCOUNTER CONSULTATON NOTE      [facility]  [phone]      Assessment/Recommendations   Assessment:  1.  Chest pain: Atypical chest discomfort and discussed this with her today.  She is highly concerned about her family history and knows that she is high risk due to long history of smoking as well.  We discussed further evaluation and she would like to proceed with stress testing.  We will proceed with exercise stress echocardiogram.  If not high risk may proceed with surgery as planned.  2.  Smoking history  3.  Hidradenitis suppurativa  4.  Pelvic mass    Plan:  1.  Exercise stress echo  2.  Smoking cessation counseling  Follow-up depending on above results       History of Present Illness/Subjective    HPI: Isha Gregory is a 62 year old female with history of hidradenitis suppurativa and pelvic mass who I am seeing today in rapid access clinic after recent ED visit with chest pain.  In mid March she started experiencing discomfort in the mid chest area rating to the left shoulder and down her left arm at times that felt like an intermittent squeezing sensation.  She was dealing with a lot of stress at that time particularly with her mother who has dementia.  She feels that the chest pain is related to stress.  Work-up in the ED was unremarkable including troponin, twelve-lead EKG which showed normal sinus rhythm at 93 bpm and no significant ST-T abnormalities.  Since then she has not had any further chest pain.  She denies any exertional chest pain.  She has some shortness of breath with exertion that has remained unchanged.  She smokes a pack a day for many years.  She was found to have a pelvic mass which is suspected to be not malignant however laparoscopic  "salpingo-oophorectomy has been planned to further evaluate.  Patient feels that her hidradenitis has been acting up and that she will not be able to have surgery due to infection risk.  She has a significant family history of heart disease with her brother having history of coronary artery disease in his 50s and , father history of heart disease and  in his 60s.         Physical Examination  Review of Systems   Vitals: /88 (BP Location: Right arm, Patient Position: Sitting, Cuff Size: Adult Regular)   Pulse 81   Resp 16   Ht 1.575 m (5' 2\")   Wt 72.6 kg (160 lb)   LMP 2012 (Approximate)   BMI 29.26 kg/m    BMI= Body mass index is 29.26 kg/m .  Wt Readings from Last 3 Encounters:   23 72.6 kg (160 lb)   23 73.3 kg (161 lb 9.6 oz)   23 63.5 kg (140 lb)       General Appearance:   no distress, normal body habitus   ENT/Mouth: membranes moist, no oral lesions or bleeding gums.      EYES:  no scleral icterus, normal conjunctivae   Neck: no carotid bruits or thyromegaly   Chest/Lungs:   lungs are clear to auscultation   Cardiovascular:   Regular. Normal first and second heart sounds with no murmurs  no edema bilaterally    Abdomen:  no organomegaly, masses, bruits, or tenderness; bowel sounds are present   Extremities: no cyanosis or clubbing   Skin: no xanthelasma, warm.    Neurologic:  no tremors     Psychiatric: alert and oriented x3, calm        Please refer above for cardiac ROS details.        Medical History  Surgical History Family History Social History   No past medical history on file.  Past Surgical History:   Procedure Laterality Date    LAPAROSCOPY       Family History   Problem Relation Age of Onset    Endometrial Cancer Mother     Stomach Cancer Paternal Grandmother     Breast Cancer Paternal Aunt     Anesthesia Reaction No family hx of     Deep Vein Thrombosis (DVT) No family hx of         Social History     Socioeconomic History    Marital status:  "     Spouse name: Not on file    Number of children: Not on file    Years of education: Not on file    Highest education level: Not on file   Occupational History    Not on file   Tobacco Use    Smoking status: Every Day     Packs/day: 1.00     Types: Cigarettes     Start date: 1/1/1977    Smokeless tobacco: Not on file   Vaping Use    Vaping status: Not on file   Substance and Sexual Activity    Alcohol use: Not Currently    Drug use: Not Currently    Sexual activity: Yes     Partners: Male     Birth control/protection: Post-menopausal   Other Topics Concern    Not on file   Social History Narrative    Not on file     Social Determinants of Health     Financial Resource Strain: Not on file   Food Insecurity: Not on file   Transportation Needs: Not on file   Physical Activity: Not on file   Stress: Not on file   Social Connections: Not on file   Intimate Partner Violence: Not on file   Housing Stability: Not on file           Medications  Allergies   Current Outpatient Medications   Medication Sig Dispense Refill    Ascorbic Acid (VITAMIN C ADULT GUMMIES PO) Take 1 Gum by mouth At Bedtime      cholecalciferol (D3 2000) 50 MCG (2000 UT) CAPS Take 2 capsules by mouth At Bedtime      HERBALS Take by mouth every morning Nopela, supplement, 5 oz per day      loratadine (CLARITIN) 10 MG tablet Take 10 mg by mouth as needed      MAGNESIUM CITRATE PO Take 1 Gum by mouth daily      Menthol, Topical Analgesic, (ICY HOT) 16 % LIQD Externally apply topically as needed      Multiple Vitamins-Minerals (WOMENS MULTI GUMMIES) CHEW Take 2 Gum by mouth every morning      mupirocin (BACTROBAN) 2 % external ointment Apply topically as needed      Pseudoephedrine-Ibuprofen (ADVIL COLD/SINUS PO) Take 200 mg by mouth as needed      TURMERIC CURCUMIN PO Take 2 tablets by mouth daily (with breakfast)      vitamin E 400 units TABS Take 400 Units by mouth three times a week       No Known Allergies       Lab Results    Chemistry/lipid CBC  Cardiac Enzymes/BNP/TSH/INR   No results for input(s): CHOL, HDL, LDL, TRIG, CHOLHDLRATIO in the last 21116 hours.  No results for input(s): LDL in the last 79262 hours.  Recent Labs   Lab Test 03/20/23 2108      POTASSIUM 4.2   CHLORIDE 108*   CO2 21*   GLC 98   BUN 19   CR 0.77   GFRESTIMATED 88   SUSAN 9.4     Recent Labs   Lab Test 03/20/23 2108   CR 0.77     No results for input(s): A1C in the last 41470 hours.       Recent Labs   Lab Test 03/20/23 2108   WBC 10.2   HGB 15.2   HCT 44.3   MCV 92        Recent Labs   Lab Test 03/20/23 2108   HGB 15.2    Recent Labs   Lab Test 03/21/23  0002 03/20/23 2108   TROPONINI <0.01 <0.01     No results for input(s): BNP, NTBNPI, NTBNP in the last 85972 hours.  No results for input(s): TSH in the last 24524 hours.  No results for input(s): INR in the last 14125 hours.     Trupti Reina MD        Thank you for allowing me to participate in the care of your patient.      Sincerely,     Trupti Reina MD     [facility]  cc:   Misa Griffin MD  [address] [place] [address]

## 2023-05-08 ENCOUNTER — PATIENT OUTREACH (OUTPATIENT)
Dept: ONCOLOGY | Facility: CLINIC | Age: 63
End: 2023-05-08
Payer: COMMERCIAL

## 2023-05-08 NOTE — TELEPHONE ENCOUNTER
"Patient reached out and has many concerns about surgery    Patient has \"people in her life\" that are telling her she needs a second opinion     Patient has concerns with surgery being the right thing and wants Dr Tolentino to know this has nothing to do with him     Patient also had concerns that she was told the mass was something different in the ED versus what the scan says and what Dr Tolentino confirmed     Patient didn't really have any questions she wanted to review just wants to cancel surgery.     Patient had questions about her HPV and pap smear results. RN answered these to the best of her ability.     Patient will reach back out if she wants surgery with us    Patient appreciative of her care     Elayne Valles RN    "

## 2023-05-09 ENCOUNTER — TELEPHONE (OUTPATIENT)
Dept: ONCOLOGY | Facility: CLINIC | Age: 63
End: 2023-05-09
Payer: COMMERCIAL

## 2023-05-09 NOTE — TELEPHONE ENCOUNTER
Per RN[facility] message. Post-op and Surgery were both cancelled at patients request.    Sofía Torres on 5/9/2023 at 8:40 AM

## 2023-05-17 ENCOUNTER — TELEPHONE (OUTPATIENT)
Dept: CARDIOLOGY | Facility: CLINIC | Age: 63
End: 2023-05-17

## 2023-05-17 NOTE — TELEPHONE ENCOUNTER
[facility] Call Center    Phone Message    May a detailed message be left on voicemail: yes     Reason for Call: Other: Please cancel pt stress test. she wishes to do it at another time. thank you     Action Taken: Message routed to:  Other: Cardiology    Travel Screening: Not Applicable       Thank you!  [facility]